# Patient Record
Sex: FEMALE | Race: WHITE
[De-identification: names, ages, dates, MRNs, and addresses within clinical notes are randomized per-mention and may not be internally consistent; named-entity substitution may affect disease eponyms.]

---

## 2018-03-16 ENCOUNTER — HOSPITAL ENCOUNTER (EMERGENCY)
Dept: HOSPITAL 42 - ED | Age: 38
Discharge: HOME | End: 2018-03-16
Payer: MEDICAID

## 2018-03-16 VITALS
OXYGEN SATURATION: 100 % | DIASTOLIC BLOOD PRESSURE: 80 MMHG | RESPIRATION RATE: 17 BRPM | HEART RATE: 71 BPM | SYSTOLIC BLOOD PRESSURE: 125 MMHG

## 2018-03-16 VITALS — BODY MASS INDEX: 27.4 KG/M2

## 2018-03-16 VITALS — TEMPERATURE: 98.6 F

## 2018-03-16 DIAGNOSIS — R20.0: Primary | ICD-10-CM

## 2018-03-16 LAB
ALBUMIN SERPL-MCNC: 4.5 G/DL
ALBUMIN/GLOB SERPL: 1.3 {RATIO}
ALT SERPL-CCNC: 21 U/L
APPEARANCE UR: CLEAR
AST SERPL-CCNC: 22 U/L
BASOPHILS # BLD AUTO: 0.02 K/MM3
BASOPHILS NFR BLD: 0.3 %
BILIRUB UR-MCNC: NEGATIVE MG/DL
BUN SERPL-MCNC: 11 MG/DL
CALCIUM SERPL-MCNC: 9.4 MG/DL
COLOR UR: YELLOW
EOSINOPHIL # BLD: 0 10*3/UL
EOSINOPHIL NFR BLD: 0.3 %
ERYTHROCYTE [DISTWIDTH] IN BLOOD BY AUTOMATED COUNT: 13.1 %
GFR NON-AFRICAN AMERICAN: > 60
GLUCOSE UR STRIP-MCNC: NEGATIVE MG/DL
GRANULOCYTES # BLD: 4.39 10*3/UL
GRANULOCYTES NFR BLD: 66.5 %
HGB BLD-MCNC: 13.9 G/DL
LEUKOCYTE ESTERASE UR-ACNC: NEGATIVE LEU/UL
LYMPHOCYTES # BLD: 1.8 10*3/UL
LYMPHOCYTES NFR BLD AUTO: 27.3 %
MCH RBC QN AUTO: 30.7 PG
MCHC RBC AUTO-ENTMCNC: 33.9 G/DL
MCV RBC AUTO: 90.5 FL
MONOCYTES # BLD AUTO: 0.4 10*3/UL
MONOCYTES NFR BLD: 5.6 %
PH UR STRIP: 6.5 [PH]
PLATELET # BLD: 222 10^3/UL
PMV BLD AUTO: 11.2 FL
PROT UR STRIP-MCNC: NEGATIVE MG/DL
RBC # BLD AUTO: 4.53 10^6/UL
RBC # UR STRIP: NEGATIVE /UL
SP GR UR STRIP: 1.01
TROPONIN I SERPL-MCNC: < 0.01 NG/ML
UROBILINOGEN UR STRIP-ACNC: 0.2 E.U./DL
WBC # BLD AUTO: 6.6 10^3/UL

## 2018-03-16 NOTE — RAD
HISTORY:

r/o infiltrate  



COMPARISON:

No prior. 



FINDINGS:



LUNGS:

No active pulmonary disease.



PLEURA:

No significant pleural effusion identified, no pneumothorax apparent.



CARDIOVASCULAR:

Normal.



OSSEOUS STRUCTURES:

No significant abnormalities.



VISUALIZED UPPER ABDOMEN:

Normal.



OTHER FINDINGS:

None.



IMPRESSION:

No active disease.

## 2018-03-16 NOTE — ED PDOC
Arrival/HPI





- General


Chief Complaint: Weakness/Neurological Deficit


Time Seen by Provider: 03/16/18 13:40


Historian: Patient





- History of Present Illness


Narrative History of Present Illness (Text): 


03/16/18 13:56


A 37 year old female, with no significant past medical history, presents to the 

emergency department for left arm numbness for 4 days. Patient was sent by PMD. 

Patient reports having on and off numbness for 1 year. Denies any fever, cough, 

chest pain, shortness of breath, dizziness, blurry/double vision, or any other 

complaints at this time.





PMD: Dr. Loco





Time/Duration: < week (4 days)





Past Medical History





- Provider Review


Nursing Documentation Reviewed: Yes





- Cardiac


Hx Cardiac Disorders: No





- Pulmonary


Hx Respiratory Disorders: No





- Neurological


Hx Neurological Disorder: Yes


Other/Comment: hx left arm numbness





- HEENT


Hx HEENT Disorder: No





- Renal


Hx Renal Disorder: No





- Endocrine/Metabolic


Hx Endocrine Disorders: No





- Hematological/Oncological


Hx Blood Disorders: No





- Integumentary


Hx Dermatological Disorder: No





- Musculoskeletal/Rheumatological


Hx Musculoskeletal Disorders: No





- Gastrointestinal


Hx Gastrointestinal Disorders: No





- Genitourinary/Gynecological


Hx Genitourinary Disorders: No





- Psychiatric


Hx Depression: No


Hx Emotional Abuse: No


Hx Physical Abuse: No


Hx Substance Use: No





- Suicidal Assessment


Feels Threatened In Home Enviroment: No





Family/Social History





- Physician Review


Nursing Documentation Reviewed: Yes


Family/Social History: No Known Family HX


Smoking Status: Never Smoked


Hx Alcohol Use: Yes (occassionally)


Hx Substance Use: No


Hx Substance Use Treatment: No





Allergies/Home Meds


Allergies/Adverse Reactions: 


Allergies





Penicillins Allergy (Verified 03/16/18 13:28)


 SHORTNESS OF BREATH








Home Medications: 


 Home Meds











 Medication  Instructions  Recorded  Confirmed


 


No Known Home Med  08/05/12 03/16/18














Review of Systems





- Physician Review


All systems were reviewed & negative as marked: Yes





- Review of Systems


Constitutional: absent: Fevers


Eyes: absent: Vision Changes (no blurry/double vision)


Respiratory: absent: SOB, Cough


Cardiovascular: absent: Chest Pain


Neurological: Other (lfet arm numbness).  absent: Dizziness





Physical Exam


Vital Signs Reviewed: Yes


Vital Signs











  Temp Pulse Resp BP Pulse Ox


 


 03/16/18 16:04   71  17  125/80  100


 


 03/16/18 15:21   75  18  122/74  99


 


 03/16/18 13:29  98.6 F  77  19  124/88  99


 


 03/16/18 13:28  98.6 F  77  19  124/88  99











Temperature: Afebrile


Blood Pressure: Normal


Pulse: Regular


Respiratory Rate: Normal


Appearance: Positive for: Well-Appearing


Pain Distress: None


Mental Status: Positive for: Alert and Oriented X 3


Finger Stick Blood Glucose: 96





- Systems Exam


Head: Present: Atraumatic, Normocephalic


Pupils: Present: PERRL


Extroacular Muscles: Present: EOMI


Conjunctiva: Present: Normal


Mouth: Present: Moist Mucous Membranes


Neck: Present: Normal Range of Motion


Respiratory/Chest: Present: Clear to Auscultation, Good Air Exchange.  No: 

Respiratory Distress, Accessory Muscle Use


Cardiovascular: Present: Regular Rate and Rhythm, Normal S1, S2.  No: Murmurs


Abdomen: Present: Normal Bowel Sounds.  No: Tenderness, Distention, Peritoneal 

Signs


Back: Present: Normal Inspection


Upper Extremity: Present: Normal Inspection.  No: Cyanosis, Edema


Lower Extremity: Present: Normal Inspection.  No: Edema


Neurological: Present: GCS=15, CN II-XII Intact, Speech Normal


Skin: Present: Warm, Dry, Normal Color.  No: Rashes


Psychiatric: Present: Alert, Oriented x 3, Normal Insight, Normal Concentration





Medical Decision Making


ED Course and Treatment: 


03/16/18 14:00


Impression:  37 year old female with left arm numbness. Physical exam is 

unremarkable. 





Plan:


-- EKG


-- Head CT


-- Chest X-ray


-- Labs


-- Urinalysis


-- Reassess and disposition





Progress Notes:


03/16/2018 15:22


Head CT


IMPRESSION: No acute findings.


Dictator: Logan Beth MD





03/16/2018 15:49


Chest X-ray


IMPRESSION: No active disease.


Dictator: Logan Beth MD





EKG:


Ordered, reviewed, and independently interpreted the EKG.


Rate : 74 BPM


Rhythm : NSR


Interpretation : No ST-segment elevations or depressions, no T-wave inversions, 

normal intervals.


Comparison : No previous EKG for comparison.





03/16/2018 15:54


Case discussed with Dr. Loco, whom agrees patient to follow-up with her, 

cardiology, and neuro next week.











- Lab Interpretations


Lab Results: 








 03/16/18 14:00 





 03/16/18 14:00 





 Lab Results





03/16/18 14:12: POC Glucose (mg/dL) 96


03/16/18 14:00: Sodium 140, Potassium 3.8, Chloride 100, Carbon Dioxide 28, 

Anion Gap 15, BUN 11, Creatinine 0.5 L, Est GFR ( Amer) > 60, Est GFR (

Non-Af Amer) > 60, Random Glucose 109, Calcium 9.4, Magnesium 1.9, Total 

Bilirubin 0.2, AST 22, ALT 21, Alkaline Phosphatase 45, Lactate Dehydrogenase 

368, Total Creatine Kinase 77, Troponin I < 0.01, Total Protein 8.1, Albumin 4.5

, Globulin 3.6, Albumin/Globulin Ratio 1.3


03/16/18 14:00: Urine Color Yellow, Urine Appearance Clear, Urine pH 6.5, Ur 

Specific Gravity 1.015, Urine Protein Negative, Urine Glucose (UA) Negative, 

Urine Ketones Negative, Urine Blood Negative, Urine Nitrate Negative, Urine 

Bilirubin Negative, Urine Urobilinogen 0.2, Ur Leukocyte Esterase Negative


03/16/18 14:00: WBC 6.6, RBC 4.53, Hgb 13.9, Hct 41.0, MCV 90.5, MCH 30.7, MCHC 

33.9, RDW 13.1, Plt Count 222, MPV 11.2 H, Gran % 66.5, Lymph % (Auto) 27.3, 

Mono % (Auto) 5.6, Eos % (Auto) 0.3 L, Baso % (Auto) 0.3, Gran # 4.39, Lymph # (

Auto) 1.8, Mono # (Auto) 0.4, Eos # (Auto) 0.0, Baso # (Auto) 0.02








I have reviewed the lab results: Yes





- RAD Interpretation


Radiology Orders: 








03/16/18 13:48


HEAD W/O CONTRAST [CT] Stat 


CHEST PORTABLE [RAD] Stat 














- Scribe Statement


The provider has reviewed the documentation as recorded by the Aliya Plascencia





Provider Scribe Attestation:


All medical record entries made by the Scribe were at my direction and 

personally dictated by me. I have reviewed the chart and agree that the record 

accurately reflects my personal performance of the history, physical exam, 

medical decision making, and the department course for this patient. I have 

also personally directed, reviewed, and agree with the discharge instructions 

and disposition.








Disposition/Present on Arrival





- Present on Arrival


Any Indicators Present on Arrival: No


History of DVT/PE: No


History of Uncontrolled Diabetes: No


Urinary Catheter: No


History of Decub. Ulcer: No


History Surgical Site Infection Following: None





- Disposition


Have Diagnosis and Disposition been Completed?: Yes


Diagnosis: 


 Numbness





Disposition: HOME/ ROUTINE


Disposition Time: 15:25


Condition: GOOD


Additional Instructions: 





Thank you for letting us take care of you today. The emergency medical care you 

received today was directed at your acute symptoms. If you were prescribed any 

medication, please fill it and take as directed. It may take several days for 

your symptoms to resolve. Return to the Emergency Department if your symptoms 

worsen, do not improve, or if you have any other problems.





Please contact your doctor or call one of the physicians/clinics you have been 

referred to that are listed on the Patient Visit Information form that is 

included in your discharge packet. Bring any paperwork you were given at 

discharge with you along with any medications you are taking to your follow up 

visit. Our treatment cannot replace ongoing medical care by a primary care 

provider (PCP) outside of the emergency department.





Thank you for allowing the [facility] team to be part of your care today.

















Follow up with your primary doctor, cardiologist and neurologist next week for 

re-evaluation and further management.


Referrals: 


Lisa Loco MD [Primary Care Provider] - Follow up with primary


George Cartagena MD [Staff Provider] - Follow up with primary


Jaqueline Toledo MD [Staff Provider] - Follow up with primary


Forms:  [employer] [facility] [employer] (English)

## 2018-03-16 NOTE — CT
PROCEDURE:  CT HEAD WITHOUT CONTRAST.



HISTORY:

r/o ICH



COMPARISON:

None available. 



TECHNIQUE:

Axial computed tomography images were obtained through the head/brain 

without intravenous contrast.  



Radiation dose:



Total exam DLP = 902 mGy-cm.



This CT exam was performed using one or more of the following dose 

reduction techniques: Automated exposure control, adjustment of the 

mA and/or kV according to patient size, and/or use of iterative 

reconstruction technique.



FINDINGS:



HEMORRHAGE:

No intracranial hemorrhage. 



BRAIN:

No mass effect or edema.  No atrophy or chronic microvascular 

ischemic changes.



VENTRICLES:

Unremarkable. No hydrocephalus. 



CALVARIUM:

Unremarkable.



PARANASAL SINUSES:

Unremarkable as visualized. No significant inflammatory changes.



MASTOID AIR CELLS:

Unremarkable as visualized. No inflammatory changes.



OTHER FINDINGS:

None.



IMPRESSION:

No acute findings

## 2018-03-17 NOTE — CARD
--------------- APPROVED REPORT --------------





EKG Measurement

Heart [id number]

ND [id number]

[id number]

[id number]

[id number]



<Conclusion>

Normal sinus rhythm

Normal ECG

## 2019-02-28 ENCOUNTER — HOSPITAL ENCOUNTER (EMERGENCY)
Dept: HOSPITAL 42 - ED | Age: 39
Discharge: HOME | End: 2019-02-28
Payer: MEDICAID

## 2019-02-28 VITALS — SYSTOLIC BLOOD PRESSURE: 132 MMHG | HEART RATE: 85 BPM | DIASTOLIC BLOOD PRESSURE: 75 MMHG | OXYGEN SATURATION: 100 %

## 2019-02-28 VITALS — BODY MASS INDEX: 27.4 KG/M2

## 2019-02-28 VITALS — TEMPERATURE: 97.4 F | RESPIRATION RATE: 18 BRPM

## 2019-02-28 DIAGNOSIS — T14.8XXA: ICD-10-CM

## 2019-02-28 DIAGNOSIS — R68.84: Primary | ICD-10-CM
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
## 2019-02-28 NOTE — ED PDOC
Physical Exam





Vital Signs











  Temp Pulse Resp BP Pulse Ox


 


 02/28/19 17:50   80  18  131/72  98


 


 02/28/19 15:59  97.4 F L  87  18  128/85  98














Medical Decision Making


ED Course and Treatment: 


02/28/19 19:00


Case endorsed to me by Dr. Hdz. Patient apparently was eating when she felt

a sudden cracking sensation to right upper jaw, with discomfort following 

afterward. CT Maxillofacial pending at this time. 





02/28/19 20:11


CT results show no evidence of fracture or dislocation.Patient feels better at 

this time previously treated.No evidence clinically or on exam of any signs of 

dislocation or sinusitis.








- RAD Interpretation


Radiology Orders: 











02/28/19 16:12


MAXILLOFACIAL W/O CONTRAST [CT] Stat 














- Medication Orders


Current Medication Orders: 














Discontinued Medications





Acetaminophen (Tylenol 325mg Tab)  650 mg PO STAT STA


   Stop: 02/28/19 16:45


   Last Admin: 02/28/19 17:12  Dose: 650 mg





MAR Pain/Vitals


 Document     02/28/19 17:12  COLLETTE  (Rec: 02/28/19 17:12  JAGDEEP  [id number])


     Pain Reassessment


      Is This A Pain ReAssessment?               No


     Sleep


      Is patient sleeping during reassessment?   No


     Presence of Pain


      Presence of Pain                           Yes





Ibuprofen (Motrin Tab)  600 mg PO STAT STA


   Stop: 02/28/19 16:14


   Last Admin: 02/28/19 17:12  Dose: 600 mg





MAR Pain/Vitals


 Document     02/28/19 17:12  COLLETTE  (Rec: 02/28/19 17:12  COLLETTE  [id number])


     Pain Reassessment


      Is This A Pain ReAssessment?               No


     Sleep


      Is patient sleeping during reassessment?   No


     Presence of Pain


      Presence of Pain                           Yes


Re-Assess: MAR Pain/Vitals


 Document     02/28/19 18:12  COLLETTE  (Rec: 02/28/19 19:12  COLLETTE  [id number])


     Pain Reassessment


      Is This A Pain ReAssessment?               Yes


     Sleep


      Is patient sleeping during reassessment?   No


     Presence of Pain


      Presence of Pain                           Yes


     Pain Scale Used


     Protocol:  PSCALES


      Pain Scale Used                            Numeric


     Location


      Intensity                                  3


      Scale Used                                 Numeric














- Scribe Statement


The provider has reviewed the documentation as recorded by the Aliya Plascencia





Provider Scribe Attestation:


All medical record entries made by the Scribe were at my direction and 

personally dictated by me. I have reviewed the chart and agree that the record 

accurately reflects my personal performance of the history, physical exam, 

medical decision making, and the department course for this patient. I have also

 personally directed, reviewed, and agree with the discharge instructions and 

disposition.








Disposition/Present on Arrival





- Present on Arrival


Any Indicators Present on Arrival: No


History of DVT/PE: No


History of Uncontrolled Diabetes: No


Urinary Catheter: No


History of Decub. Ulcer: No


History Surgical Site Infection Following: None





- Disposition


Have Diagnosis and Disposition been Completed?: Yes


Diagnosis: 


 Mandible pain, Muscle strain





Disposition: HOME/ ROUTINE


Disposition Time: 20:16


Patient Plan: Discharge


Patient Problems: 


                             Current Active Problems











Problem Status Onset


 


Mandible pain Acute 


 


Muscle strain Acute 











Condition: GOOD


Discharge Instructions (ExitCare):  Muscle Strain (DC)


Print Language: ENGLISH


Additional Instructions: 


Take meds as prescribed/follow up with the ear/nose/throat doctor this week


Prescriptions: 


Naproxen [Naprosyn] 500 mg PO BID PRN #14 tab


 PRN Reason: Pain


Referrals: 


Cole Farah DO [[facility]] - Follow up with primary


Forms:  [employer] (English)

## 2019-02-28 NOTE — ED PDOC
Arrival/HPI





- General


Chief Complaint: Dental Pain


Time Seen by Provider: 02/28/19 15:57


Historian: Patient





- History of Present Illness


Narrative History of Present Illness (Text): 





02/28/19 16:08


38 year old female, whose past medical history includes left arm numbness and 

breast surgery, who presents to the Emergency department status post hearing a 

"very loud crack" suddenly from the right side of her jaw around 14:30 today 

while she was eating. Patient reports she was eating rice and chicken and while 

she was chewing, she suddenly heard a crack. Patient states the pain radiates 

from the lower right side of her jaw toward the right side of her forehead near 

her ear. Patient notes difficulty swallowing. Patient denies taking any 

medication to relieve the symptoms. Patient denies any drooling, fever, chills, 

chest pain, shortness of breath, nausea, vomiting, diarrhea, urinary symptoms, 

back pain, neck pain, headache, dizziness, or any other complaints.








Time/Duration: 1-3 hours (Patient notes onset at 14:30 today after hearing a 

sudden crack in the right side of her jaw while eating lunch)


Symptom Onset: Sudden


Symptom Course: Unchanged


Activities at Onset: Eating (Patient notes she was eating rice and chicken 

around 14:30 today)





Past Medical History





- Provider Review


Nursing Documentation Reviewed: Yes





- Infectious Disease


Hx of Infectious Diseases: None





- Cardiac


Hx Cardiac Disorders: No





- Pulmonary


Hx Respiratory Disorders: No





- Neurological


Hx Neurological Disorder: Yes


Other/Comment: hx left arm numbness





- HEENT


Hx HEENT Disorder: No





- Renal


Hx Renal Disorder: No





- Endocrine/Metabolic


Hx Endocrine Disorders: No





- Hematological/Oncological


Hx Blood Disorders: No





- Integumentary


Hx Dermatological Disorder: No





- Musculoskeletal/Rheumatological


Hx Musculoskeletal Disorders: No





- Gastrointestinal


Hx Gastrointestinal Disorders: No





- Genitourinary/Gynecological


Hx Genitourinary Disorders: No





- Psychiatric


Hx Depression: No


Hx Emotional Abuse: No


Hx Physical Abuse: No


Hx Substance Use: No





- Surgical History


Other/Comment: breast surgery





- Anesthesia


Hx Anesthesia: Yes


Hx Anesthesia Reactions: No


Hx Malignant Hyperthermia: No





- Suicidal Assessment


Feels Threatened In Home Enviroment: No





Family/Social History





- Physician Review


Nursing Documentation Reviewed: Yes


Family/Social History: No Known Family HX


Smoking Status: Never Smoked


Hx Alcohol Use: Yes (occassionally)


Hx Substance Use: No


Hx Substance Use Treatment: No





Allergies/Home Meds


Allergies/Adverse Reactions: 


Allergies





Penicillins Allergy (Verified 02/28/19 15:55)


   SHORTNESS OF BREATH








Home Medications: 


                                    Home Meds











 Medication  Instructions  Recorded  Confirmed


 


No Known Home Med  08/05/12 02/28/19














Review of Systems





- Physician Review


All systems were reviewed & negative as marked: Yes





- Review of Systems


Constitutional: Normal.  absent: Fevers


ENT: Other (Patient notes difficulty swallowing).  absent: Normal


Respiratory: absent: SOB


Cardiovascular: Normal.  absent: Chest Pain


Gastrointestinal: Normal.  absent: Abdominal Pain, Diarrhea, Nausea, Vomiting


Genitourinary Female: Normal.  absent: Urine Output Changes


Musculoskeletal: Other (Patient notes pain radiating from right lower jaw up to 

right side of forehead, near the ear, after hearing a sudden crack from right 

side of jaw while eating lunch at 14:30 today).  absent: Normal, Back Pain, Neck

Pain


Neurological: Normal.  absent: Headache, Dizziness





Physical Exam


Vital Signs Reviewed: Yes





Vital Signs











  Temp Pulse Resp BP Pulse Ox


 


 02/28/19 15:59  97.4 F L  87  18  128/85  98











Temperature: Afebrile


Blood Pressure: Normal


Pulse: Regular


Respiratory Rate: Normal


Appearance: Positive for: Well-Appearing, Non-Toxic


Pain Distress: Mild


Mental Status: Positive for: Alert and Oriented X 3





- Systems Exam


Head: Present: Atraumatic, Normocephalic


Pupils: Present: PERRL


Extroacular Muscles: Present: EOMI


Conjunctiva: Present: Normal


Mouth: Present: Other (Tenderness to palpation of the right maxillary)


Neck: Present: Normal Range of Motion


Respiratory/Chest: Present: Clear to Auscultation, Good Air Exchange.  No: 

Respiratory Distress, Accessory Muscle Use


Cardiovascular: Present: Regular Rate and Rhythm, Normal S1, S2.  No: Murmurs


Abdomen: No: Tenderness, Distention, Peritoneal Signs


Back: Present: Normal Inspection


Upper Extremity: Present: Normal Inspection.  No: Cyanosis, Edema


Lower Extremity: Present: Normal Inspection.  No: Edema


Neurological: Present: GCS=15, CN II-XII Intact, Speech Normal


Skin: Present: Warm, Dry, Normal Color.  No: Rashes


Psychiatric: Present: Alert, Oriented x 3, Normal Insight, Normal Concentration





Medical Decision Making


ED Course and Treatment: 





02/28/19 16:08


Impression:


38 year old female who presents to the Emergency department status post hearing 

a "very loud crack" suddenly from the right side of her jaw around 14:30 today 

while she was eating rice and chicken.





Differential Diagnosis included but are not limited to:  





Plan:


-- CT of maxillofacial 


-- Motrin


-- POC Urine Pregnancy Test


-- Reassess and disposition





Prior Visits:


Notes and results from previous visits were reviewed. Patient was last seen in 

the emergency department on 03/06/18 for left arm numbness for 4 days. Patient w

as discharged home in good condition. 





Progress Notes:


02/28/19 19:12


Signout given to Dr. Medina who will resume the patient's care. Patient 

pending CT results. 








- RAD Interpretation


Radiology Orders: 











02/28/19 16:12


MAXILLOFACIAL W/O CONTRAST [CT] Stat 














- Medication Orders


Current Medication Orders: 











Acetaminophen (Tylenol 325mg Tab)  650 mg PO STAT STA


   Stop: 02/28/19 16:45





Discontinued Medications





Ibuprofen (Motrin Tab)  600 mg PO STAT STA


   Stop: 02/28/19 16:14











- Scribe Statement


The provider has reviewed the documentation as recorded by the Scribe





Ashanti Coleman 





All medical record entries made by the Scribe were at my direction and 

personally dictated by me. I have reviewed the chart and agree that the record 

accurately reflects my personal performance of the history, physical exam, 

medical decision making, and the department course for this patient. I have also

 personally directed, reviewed, and agree with the discharge instructions and 

disposition.





Disposition/Present on Arrival





- Present on Arrival


History of DVT/PE: No


History of Uncontrolled Diabetes: No


Urinary Catheter: No


History of Decub. Ulcer: No


History Surgical Site Infection Following: None





- Disposition


Forms:  [facility] (English)

## 2019-03-01 NOTE — CT
Date of service: 



02/28/2019



PROCEDURE:  CT MAXILLOFACIAL BONES WITHOUT CONTRAST



HISTORY:

possible jaw fracture



COMPARISON:

None available.



TECHNIQUE:

Contiguous axial CT  images of the maxillofacial bones were obtained. 

Coronal and sagittal reformats were generated.



Radiation dose:



Total exam DLP = 775.14 mGy-cm.



This CT exam was performed using one or more of the following dose 

reduction techniques: Automated exposure control, adjustment of the 

mA and/or kV according to patient size, and/or use of iterative 

reconstruction technique.



FINDINGS:



NASAL BONES:

Unremarkable.



ORBITS:

Unremarkable.



PARANASAL SINUSES/ MASTOIDS:

Clear.



MAXILLA:

Unremarkable.



MANDIBLE/ TEMPOROMANDIBULAR JOINTS:

Unremarkable.



SKULL BASE:

Unremarkable.



TEMPORAL BONES:

Middle ears and mastoid grossly unremarkable.



OTHER FINDINGS:

Minimal left maxillary sinusitis.  Small mucous retention cyst or 

polyp in the right maxillary sinus.



IMPRESSION:

No fracture.